# Patient Record
Sex: MALE | Race: OTHER | URBAN - METROPOLITAN AREA
[De-identification: names, ages, dates, MRNs, and addresses within clinical notes are randomized per-mention and may not be internally consistent; named-entity substitution may affect disease eponyms.]

---

## 2020-11-24 ENCOUNTER — EMERGENCY (EMERGENCY)
Facility: HOSPITAL | Age: 70
LOS: 1 days | Discharge: HOME | End: 2020-11-24
Attending: EMERGENCY MEDICINE | Admitting: EMERGENCY MEDICINE
Payer: MEDICAID

## 2020-11-24 VITALS
RESPIRATION RATE: 18 BRPM | OXYGEN SATURATION: 96 % | WEIGHT: 268.08 LBS | SYSTOLIC BLOOD PRESSURE: 191 MMHG | HEIGHT: 72 IN | HEART RATE: 66 BPM | TEMPERATURE: 99 F | DIASTOLIC BLOOD PRESSURE: 89 MMHG

## 2020-11-24 VITALS
OXYGEN SATURATION: 97 % | SYSTOLIC BLOOD PRESSURE: 176 MMHG | DIASTOLIC BLOOD PRESSURE: 81 MMHG | RESPIRATION RATE: 18 BRPM | HEART RATE: 57 BPM | TEMPERATURE: 99 F

## 2020-11-24 PROCEDURE — 99283 EMERGENCY DEPT VISIT LOW MDM: CPT | Mod: 25

## 2020-11-24 PROCEDURE — 69200 CLEAR OUTER EAR CANAL: CPT

## 2020-11-24 NOTE — ED PROVIDER NOTE - NSFOLLOWUPCLINICS_GEN_ALL_ED_FT
[facility]  ENT  [address], [address]nd [place] [address]  Phone: [phone]  Fax:   Follow Up Time:

## 2020-11-24 NOTE — ED PROVIDER NOTE - NSFOLLOWUPINSTRUCTIONS_ED_ALL_ED_FT
Ear Foreign Body    An ear foreign body is an object that is stuck in your ear. The object is usually stuck in the ear canal.    CAUSES  In all ages of people, the most common foreign bodies are insects that enter the ear canal. It is common for young children to put objects into the ear canal. These may include tori, beads, parts of toys, and any other small objects that fit into the ear. In adults, objects such as cotton swabs may become lodged in the ear canal.     SIGNS AND SYMPTOMS  A foreign body in the ear may cause:    Pain.  Buzzing or roaring sounds.  Hearing loss.  Ear drainage or bleeding.  Nausea and vomiting.  A feeling that your ear is full.    DIAGNOSIS  Your health care provider may be able to diagnose an ear foreign body based on the information that you provide, your symptoms, and a physical exam. Your health care provider may also perform tests, such as testing your hearing and your ear pressure, to check for infection or other problems that are caused by the foreign body in your ear.    TREATMENT  Treatment depends on what the foreign body is, the location of the foreign body in your ear, and whether or not the foreign body has injured any part of your inner ear. If the foreign body is visible to your health care provider, it may be possible to remove the foreign body using:    A tool, such as medical tweezers (forceps) or a suction tube (catheter).  Irrigation. This uses water to flush the foreign body out of your ear. This is used only if the foreign body is not likely to swell or enlarge when it is put in water.    If the foreign body is not visible or your health care provider was not able to remove the foreign body, you may be referred to a specialist for removal. You may also be prescribed antibiotic medicine or ear drops to prevent infection. If the foreign body has caused injury to other parts of your ear, you may need additional treatment.    HOME CARE INSTRUCTIONS  Keep all follow-up visits as directed by your health care provider. This is important.  Take medicines only as directed by your health care provider.  If you were prescribed an antibiotic medicine, finish it all even if you start to feel better.    PREVENTION  Keep small objects out of reach of young children. Tell children not to put anything in their ears.  Do not put anything in your ear, including cotton swabs, to clean your ears. Talk to your health care provider about how to clean your ears safely.    SEEK MEDICAL CARE IF:  You have a headache.  Your have blood coming from your ear.  You have a fever.  You have increased pain or swelling of your ear.  Your hearing is reduced.  You have discharge coming from your ear.    ADDITIONAL NOTES AND INSTRUCTIONS    Please follow up with your Primary MD in 24-48 hr.  Seek immediate medical care for any new/worsening signs or symptoms.

## 2020-11-24 NOTE — ED PROVIDER NOTE - CLINICAL SUMMARY MEDICAL DECISION MAKING FREE TEXT BOX
a/p;  FB removed, f/u ent 1-2 weeks, strict return precautions provided. pt counselled on not using FB to scratch ear canal

## 2020-11-24 NOTE — ED PROVIDER NOTE - ATTENDING CONTRIBUTION TO CARE
69M PMH obesity, DM, p/w FB to L ear. states yesterday he was scratching his ear with insulin needle with cap on and cap fell off into canal. no pain. no discharge. hasn't been able to get the cap out so came to ED. no numbness, weakness.     on exam, AFVSS, well araceli nad, ncat, eomi, perrla, mmm, L TM intact, FB removed w TM intact after, no blood or discharge in canal, no pain w manipulation of tragus, aaox3, no focal deficits, no le edema or calf ttp,     a/p;  FB removed, f/u ent 1-2 weeks, strict return precautions provided. pt counselled on not using FB to scratch ear canal

## 2020-11-24 NOTE — ED PROVIDER NOTE - PATIENT PORTAL LINK FT
You can access the FollowMyHealth Patient Portal offered by [facility] by registering at the following website: [email or web address]. By joining [employer]’s FollowMyHealth portal, you will also be able to view your health information using other applications (apps) compatible with our system.

## 2020-11-24 NOTE — ED PROVIDER NOTE - OBJECTIVE STATEMENT
pt c/o fb left ear, used insulin needle to scratch inside ear and cap came off, stuck inside. went to PMD but he was unable to remove it. Denies fever/chill/HA/dizziness/chest pain/palpitation/sob/abd pain/n/v/d/ black stool/bloody stool/urinary sxs

## 2020-11-24 NOTE — ED PROVIDER NOTE - PHYSICAL EXAMINATION
CONSTITUTIONAL: Well-appearing; well-nourished; in no apparent distress.   ENT: fb left ear; normal pharynx with no tonsillar hypertrophy.   SKIN: Normal for age and race; warm; dry; good turgor; no apparent lesions or exudate.   NEURO/PSYCH: grossly unremarkable. mood and manner are appropriate. Grooming and personal hygiene are appropriate. No apparent thoughts of harm to self or others.

## 2020-11-24 NOTE — ED ADULT TRIAGE NOTE - CHIEF COMPLAINT QUOTE
Last night I want to scratch my ear and it went inside and its still there - patient    Foreign is an insulin needle cap
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
## 2020-11-29 DIAGNOSIS — E11.9 TYPE 2 DIABETES MELLITUS WITHOUT COMPLICATIONS: ICD-10-CM
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
## 2020-11-29 DIAGNOSIS — T16.2XXA FOREIGN BODY IN LEFT EAR, INITIAL ENCOUNTER: ICD-10-CM
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
## 2024-11-04 NOTE — ED PROCEDURE NOTE - PROCEDURE SUPERVISED BY
Bleeding that does not stop/Pain not relieved by Medications/Fever greater than (need to indicate Fahrenheit or Celsius)/Nausea and vomiting that does not stop reid